# Patient Record
Sex: MALE | Race: OTHER | NOT HISPANIC OR LATINO | URBAN - METROPOLITAN AREA
[De-identification: names, ages, dates, MRNs, and addresses within clinical notes are randomized per-mention and may not be internally consistent; named-entity substitution may affect disease eponyms.]

---

## 2022-10-31 ENCOUNTER — EMERGENCY (EMERGENCY)
Facility: HOSPITAL | Age: 37
LOS: 1 days | Discharge: HOME | End: 2022-10-31
Admitting: EMERGENCY MEDICINE
Payer: COMMERCIAL

## 2022-10-31 VITALS
HEART RATE: 92 BPM | RESPIRATION RATE: 15 BRPM | HEIGHT: 69 IN | WEIGHT: 207.23 LBS | OXYGEN SATURATION: 97 % | DIASTOLIC BLOOD PRESSURE: 83 MMHG | SYSTOLIC BLOOD PRESSURE: 138 MMHG | TEMPERATURE: 99 F

## 2022-10-31 PROCEDURE — 73562 X-RAY EXAM OF KNEE 3: CPT | Mod: 26,LT

## 2022-10-31 PROCEDURE — 99283 EMERGENCY DEPT VISIT LOW MDM: CPT | Mod: 25

## 2022-10-31 NOTE — ED ADULT NURSE NOTE - OBJECTIVE STATEMENT
Pt presents to ED complaining of knee injury. On assessment- AOx4, breathing even and unlabored on RA, no apparent distress, VSS in triage, able to speak in clear coherent sentences, steady gait unassisted, neuro intact with no apparent facial asymmetry, PERRLA.

## 2022-10-31 NOTE — ED PROVIDER NOTE - NSICDXNOPASTMEDICALHX_GEN_ALL_ED
.  YOU ARE CURRENTLY TAKING 58 UNITS OF INSULIN TOUJEO    YOU ARE ALLOWED TO INCREASE YOUR HOME INSULIN UP TO 68 UNITS    GOAL SUGAR IN THE MORNING, FASTING, IS BETWEEN     Until you follow-up with your doctor you may use the following strategy.  Do not continue making changes beyond this plan unless your receive such advice from another health care professional.    WHEN TO INCREASE YOUR TYPICAL LONG ACTING INSULIN DOSE    When the fasting sugar (first check in the morning before eating breakfast)  is above 130 FOR MORE THAN 2 DAYS IN A ROW:       ONE   Increase next INSULIN dose by 2 UNITS later that night  (for example, instead of taking 10 units, take 12 units)    IF the fasting sugar (first check in the morning before eating breakfast)  is above 180 FOR MORE THAN 2 DAYS IN A ROW:       TWO   Increase next INSULIN dose by 4 UNITS later that night  (for example, instead of taking 10 units, take 14 units)      WHEN TO DECREASE YOUR TYPICAL LONG ACTING INSULIN    When the fasting sugar (first check in the morning before eating breakfast)  is below 90:     Decrease the next insulin dose that night by 2 units (subtract 2)      When the fasting sugar (first check in the morning before eating breakfast)  In low 80s:       CALL YOUR PRIMARY DOCTOR FOR ASSISTANCE and  Decrease next INSULIN dose based on your doctors directions   <-- Click to add NO pertinent Past Medical History
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
## 2022-10-31 NOTE — ED PROVIDER NOTE - CLINICAL SUMMARY MEDICAL DECISION MAKING FREE TEXT BOX
37 y/o M presents with left knee pain after fall while playing soccer yesterday. On exam patient has pain on range of motion of the knee. Will get x-rays of the left knee.

## 2022-10-31 NOTE — ED PROVIDER NOTE - PATIENT PORTAL LINK FT
You can access the FollowMyHealth Patient Portal offered by [facility] by registering at the following website: [email or web address]. By joining [facility]’s FollowMyHealth portal, you will also be able to view your health information using other applications (apps) compatible with our system.
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
## 2022-11-02 DIAGNOSIS — M25.562 PAIN IN LEFT KNEE: ICD-10-CM
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
